# Patient Record
(demographics unavailable — no encounter records)

---

## 2018-10-23 NOTE — CT
CT BRAIN:

 

Date:  10/23/18 

 

HISTORY:  

34-year-old female with headache. 

 

TECHNIQUE:  

Noncontrast enhanced CT images of brain obtained from the base of the skull to the vertex. Brain and 
bone windows are obtained. 

 

FINDINGS:

Noncontrast enhanced CT images of the brain demonstrate the brain to be unremarkable. No evidence of 
intracranial masses, hemorrhages, strokes, or contusions seen. The ventricles are of normal size. 

 

IMPRESSION: 

Normal CT brain.  

 

POS: [facility]